# Patient Record
Sex: FEMALE | Race: WHITE | NOT HISPANIC OR LATINO | URBAN - METROPOLITAN AREA
[De-identification: names, ages, dates, MRNs, and addresses within clinical notes are randomized per-mention and may not be internally consistent; named-entity substitution may affect disease eponyms.]

---

## 2017-05-04 ENCOUNTER — NEW PATIENT (OUTPATIENT)
Dept: URBAN - METROPOLITAN AREA CLINIC 24 | Facility: CLINIC | Age: 82
End: 2017-05-04
Payer: MEDICARE

## 2017-05-04 DIAGNOSIS — G51.0 BELLS PALSY: ICD-10-CM

## 2017-05-04 DIAGNOSIS — H25.813 COMBINED FORMS OF AGE-RELATED CATARACT, BILATERAL: Primary | ICD-10-CM

## 2017-05-04 DIAGNOSIS — H04.123 TEAR FILM INSUFFICIENCY OF BILATERAL LACRIMAL GLANDS: ICD-10-CM

## 2017-05-04 PROCEDURE — 92004 COMPRE OPH EXAM NEW PT 1/>: CPT | Performed by: OPTOMETRIST

## 2017-05-04 ASSESSMENT — VISUAL ACUITY
OS: 20/25
OD: 20/25

## 2017-05-04 ASSESSMENT — KERATOMETRY
OS: 44.27
OD: 43.70

## 2017-05-04 ASSESSMENT — INTRAOCULAR PRESSURE
OD: 18
OS: 21

## 2018-06-07 ENCOUNTER — FOLLOW UP ESTABLISHED (OUTPATIENT)
Dept: URBAN - METROPOLITAN AREA CLINIC 24 | Facility: CLINIC | Age: 83
End: 2018-06-07
Payer: MEDICARE

## 2018-06-07 PROCEDURE — 92134 CPTRZ OPH DX IMG PST SGM RTA: CPT | Performed by: OPTOMETRIST

## 2018-06-07 PROCEDURE — 92014 COMPRE OPH EXAM EST PT 1/>: CPT | Performed by: OPTOMETRIST

## 2018-06-07 ASSESSMENT — INTRAOCULAR PRESSURE
OD: 14
OS: 15

## 2018-06-07 ASSESSMENT — VISUAL ACUITY
OS: 20/40
OD: 20/30

## 2019-06-10 ENCOUNTER — FOLLOW UP ESTABLISHED (OUTPATIENT)
Dept: URBAN - METROPOLITAN AREA CLINIC 24 | Facility: CLINIC | Age: 84
End: 2019-06-10
Payer: MEDICARE

## 2019-06-10 DIAGNOSIS — H43.393 OTHER VITREOUS OPACITIES, BILATERAL: ICD-10-CM

## 2019-06-10 DIAGNOSIS — H52.4 PRESBYOPIA: ICD-10-CM

## 2019-06-10 PROCEDURE — 92014 COMPRE OPH EXAM EST PT 1/>: CPT | Performed by: OPTOMETRIST

## 2019-06-10 PROCEDURE — 92015 DETERMINE REFRACTIVE STATE: CPT | Performed by: OPTOMETRIST

## 2019-06-10 PROCEDURE — 92134 CPTRZ OPH DX IMG PST SGM RTA: CPT | Performed by: OPTOMETRIST

## 2019-06-10 ASSESSMENT — INTRAOCULAR PRESSURE
OD: 13
OS: 18

## 2019-06-10 ASSESSMENT — KERATOMETRY
OS: 44.30
OD: 43.88

## 2019-06-10 ASSESSMENT — VISUAL ACUITY
OD: 20/25
OS: 20/30

## 2022-12-14 ENCOUNTER — OFFICE VISIT (OUTPATIENT)
Dept: URBAN - METROPOLITAN AREA CLINIC 24 | Facility: CLINIC | Age: 87
End: 2022-12-14
Payer: MEDICARE

## 2022-12-14 DIAGNOSIS — G51.0 BELL'S PALSY: ICD-10-CM

## 2022-12-14 DIAGNOSIS — H43.393 OTHER VITREOUS OPACITIES, BILATERAL: ICD-10-CM

## 2022-12-14 DIAGNOSIS — H04.123 TEAR FILM INSUFFICIENCY OF BILATERAL LACRIMAL GLANDS: ICD-10-CM

## 2022-12-14 DIAGNOSIS — H25.813 COMBINED FORMS OF AGE-RELATED CATARACT, BILATERAL: Primary | ICD-10-CM

## 2022-12-14 DIAGNOSIS — H52.4 PRESBYOPIA: ICD-10-CM

## 2022-12-14 PROCEDURE — 92134 CPTRZ OPH DX IMG PST SGM RTA: CPT | Performed by: OPTOMETRIST

## 2022-12-14 PROCEDURE — 99204 OFFICE O/P NEW MOD 45 MIN: CPT | Performed by: OPTOMETRIST

## 2022-12-14 ASSESSMENT — VISUAL ACUITY
OS: 20/60
OD: 20/60

## 2022-12-14 ASSESSMENT — INTRAOCULAR PRESSURE
OS: 15
OD: 15

## 2022-12-14 ASSESSMENT — KERATOMETRY
OD: 44.01
OS: 43.77

## 2022-12-14 NOTE — IMPRESSION/PLAN
Impression: [place] palsy Plan: BELLS PALSY OS 10+ YEARS AGO. GOOD RECOVERY WITH SYMMETRIC SMILE AND BROW WRINKLING. CONTINUE TO MONITOR.

## 2022-12-14 NOTE — IMPRESSION/PLAN
Impression: Presbyopia: H52.4. Plan: New srx released, pt requesting SV reading. Understands BCVA limited.

## 2022-12-14 NOTE — IMPRESSION/PLAN
Impression: Combined forms of age-related cataract, bilateral: H25.813. Plan: Cataract accounts for patient's complaints. Discussed options, surgery or spectacle change, explained surgery risks, benefits. Patient defers surgery and elects to change glasses first.  Will re-evaluate cataract(s) on return visit.